# Patient Record
Sex: MALE | Race: WHITE | NOT HISPANIC OR LATINO | Employment: FULL TIME | URBAN - METROPOLITAN AREA
[De-identification: names, ages, dates, MRNs, and addresses within clinical notes are randomized per-mention and may not be internally consistent; named-entity substitution may affect disease eponyms.]

---

## 2018-03-21 ENCOUNTER — OFFICE VISIT CONVERTED (OUTPATIENT)
Dept: UROLOGY | Facility: CLINIC | Age: 63
End: 2018-03-21
Attending: UROLOGY

## 2018-03-22 ENCOUNTER — OFFICE VISIT CONVERTED (OUTPATIENT)
Dept: SURGERY | Facility: CLINIC | Age: 63
End: 2018-03-22
Attending: PHYSICIAN ASSISTANT

## 2018-03-28 ENCOUNTER — OFFICE VISIT CONVERTED (OUTPATIENT)
Dept: UROLOGY | Facility: CLINIC | Age: 63
End: 2018-03-28
Attending: UROLOGY

## 2018-04-13 ENCOUNTER — OFFICE VISIT CONVERTED (OUTPATIENT)
Dept: UROLOGY | Facility: CLINIC | Age: 63
End: 2018-04-13
Attending: UROLOGY

## 2018-09-18 ENCOUNTER — OFFICE VISIT (OUTPATIENT)
Dept: ORTHOPEDIC SURGERY | Facility: CLINIC | Age: 63
End: 2018-09-18

## 2018-09-18 VITALS — BODY MASS INDEX: 34.69 KG/M2 | WEIGHT: 221 LBS | HEIGHT: 67 IN | TEMPERATURE: 98 F

## 2018-09-18 DIAGNOSIS — M25.512 LEFT SHOULDER PAIN, UNSPECIFIED CHRONICITY: ICD-10-CM

## 2018-09-18 DIAGNOSIS — M25.561 RIGHT KNEE PAIN, UNSPECIFIED CHRONICITY: Primary | ICD-10-CM

## 2018-09-18 DIAGNOSIS — Z96.652 STATUS POST TOTAL LEFT KNEE REPLACEMENT: ICD-10-CM

## 2018-09-18 PROCEDURE — 99213 OFFICE O/P EST LOW 20 MIN: CPT | Performed by: ORTHOPAEDIC SURGERY

## 2018-09-18 PROCEDURE — 73030 X-RAY EXAM OF SHOULDER: CPT | Performed by: ORTHOPAEDIC SURGERY

## 2018-09-18 NOTE — PROGRESS NOTES
Chief Complaint   Patient presents with   • Right Knee - Pain   • Left Shoulder - Pain             HPI  Established patient is here today with new complaints of right knee and left shoulder pain.  He has had a prior knee arthroplasty on the left side performed by me in July 2016.  He has done extremely well with that surgery.  His quality of life is improved significantly for the better.  He is not taking any pain medication for his knee pain or discomfort at this point.  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  His left shoulder is bothering him significantly.  He has pain and difficulty with abducting the shoulder.  There is a lot of nighttime pain and discomfort.  He is reluctant to raise his arm into the overhead position.  Shoulder symptoms have been going on for about 3 years.  He has been having pain and discomfort in his opposite, right knee for about 5 years.  The pain is on the medial side of the knee.  There is significant crepitus throughout the range of motion.  There is a sense of clicking and popping in the knee as well.  The patient feels that he cannot squat on the ground because of the knee pain.  I'm concerned that he might have a degenerative tear of the meniscus on the medial side of the right knee.  The patient states that he would like to have it checked out and make sure that she does not require knee replacement surgery at this point.  He works as a  and states that there are several episodes of injury that could have resulted in trauma to the medial meniscus and the rotator cuff as well.          No Known Allergies      Social History     Social History   • Marital status: Single     Spouse name: N/A   • Number of children: N/A   • Years of education: N/A     Occupational History   • Not on file.     Social History Main Topics   • Smoking status: Never Smoker   • Smokeless tobacco: Not on file   • Alcohol use No   • Drug use: No   • Sexual activity: Not on  file     Other Topics Concern   • Not on file     Social History Narrative   • No narrative on file       Family History   Problem Relation Age of Onset   • Cancer Other         Lung   • Heart disease Other    • Hypertension Other        Past Surgical History:   Procedure Laterality Date   • APPENDECTOMY     • COLONOSCOPY      Every 5 Years   • KNEE SURGERY Left 2007    Dr. Engle    • WISDOM TOOTH EXTRACTION         Past Medical History:   Diagnosis Date   • Alcoholism (CMS/HCC)    • Colon polyp    • Diabetes (CMS/HCC)    • HTN (hypertension)            Vitals:    09/18/18 1435   Temp: 98 °F (36.7 °C)             Review of Systems   Constitutional: Negative.    HENT: Negative.    Eyes: Negative.    Respiratory: Negative.    Cardiovascular: Negative.    Gastrointestinal: Negative.    Endocrine: Negative.    Genitourinary: Negative.    Musculoskeletal: Positive for gait problem and joint swelling.   Allergic/Immunologic: Negative.    Hematological: Negative.    Psychiatric/Behavioral: Negative.            Physical Exam   Constitutional: He is oriented to person, place, and time. He appears well-nourished.   HENT:   Head: Atraumatic.   Eyes: EOM are normal.   Neck: Neck supple.   Cardiovascular: Normal heart sounds and intact distal pulses.    Pulmonary/Chest: Breath sounds normal.   Abdominal: Bowel sounds are normal.   Musculoskeletal: He exhibits edema and tenderness.   Neurological: He is alert and oriented to person, place, and time.   Skin: Capillary refill takes 2 to 3 seconds.   Psychiatric: He has a normal mood and affect. His behavior is normal. Judgment and thought content normal.   Nursing note and vitals reviewed.              Joint/Body Part Specific Exam:  right knee. Patient has crepitus throughout range of motion. Positive patellar grind test. Mild effusion. Lachman is negative. Pivot shift is negative. Anterior and posterior drawer signs are negative. Significant joint line tenderness is noted on the  medial aspect of the knee. Patient has a varus orientation of the knee. There is fullness and tenderness in the Popliteal fossa. Mild distention of a Popliteal cyst is noted in this location. Range of motion in flexion is from 0- 100 degrees. Neurovascular status is intact.  Dorsalis pedis and posterior tibial artery pulses are palpable. Common peroneal nerve function is well preserved. Patient's gait is cautious and antalgic. Skin and soft tissues are mildly swollen, consistent with synovitis and effusion. The patient has a significant limp with the first few steps after starting the gait cycle. Getting out of a chair takes a lot of effort due to pain on knee flexion.The knee joint shows effusion with some thickening of the synovial membrane. There is tenderness over the meniscus. Apley’s grinding test is positive over the joint line. Yaw’s sign is positive with increased pain on torsional testing. There is a distinct click in mid flexion. Range of motion is from 0-100 degrees of flexion. No instability on medial or lateral testing. Anterior and posterior drawer testing is negative. Lachman test is negative. Joint line tenderness is present to direct palpation. There is some tenderness over the medial face of the tibia just distal to the joint line. The dorsalis pedis and posterior tibial artery pulses are palpable. Common peroneal nerve function is well preserved. Gait is cautious and somewhat antalgic. Full extension causes the patient to have quite a bit of pain and discomfort.      left knee.The patient is status post total knee arthroplasty postoperative 2 years and 3 month(s). Incision is clean. Calf is soft and nontender. Homans sign is negative. There is no clicking, popping or catching. Anterior and posterior drawer signs are negative.  There is no instability of the components. Appropriate amounts of swelling and bruising are noted. Dorsalis pedis and posterior tibial artery pulses are palpable.  Common peroneal nerve function is well preserved. Range of motion is from 0- to 130° degrees of flexion. Gait is cautious but otherwise fairly normal. There is no evidence of a deep seated joint infection.    left shoulder. The shoulder is extremely tender anteriorly. There is tenderness over the insertion of the rotator cuff over the greater tuberosity of the humerus. Slight proximal migration of the humeral head is noted. AC joint is tender. Crossover adduction test is positive. Drop arm sign is positive. Forward flexion is 0-100 degrees, abduction is 0-100° degrees, external rotation is 0-40° degrees. Patient has mild atrophy both of the supra and infraspinatus fossa. Sulcus sign is negative. There is no evidence of multidirectional instability. Neer test is positive on compression. Skin and soft tissue tenderness is noted. Patient’s strength in abduction and external rotation are extremely lacking. The pain level is 6.  X-RAY Report:  right Knee X-Ray  Indication: Evaluation of pain on the medial aspect of the right knee  AP, Lateral views  Findings: Slight narrowing of the medial joint space with irregular articular surface contour  no bony lesion  Soft tissues within normal limits  decreased joint spaces  Hardware appropriately positioned not applicable      no prior studies available for comparison.    X-RAY was ordered and reviewed by Bryan Li MD    left Shoulder X-Ray  Indication: Evaluation of pain and discomfort over the acromioclavicular articulation  AP and Lateral  Findings: Slight narrowing of the acromioclavicular joint space  no bony lesion  Soft tissues within normal limits  decreased joint spaces  Hardware appropriately positioned not applicable      no prior studies available for comparison.    X-RAY was ordered and reviewed by Bryan Li MD          Diagnostics:            Dave was seen today for pain and pain.    Diagnoses and all orders for this visit:    Right knee pain, unspecified  chronicity  -     XR Knee 1 or 2 View Right    Left shoulder pain, unspecified chronicity  -     XR Shoulder 2+ View Left            Procedures          I provided this patient with educational materials regarding exercise, bone health and cast or splint care.        Plan: Stretching and strengthening exercises of both the knees.    , GI and dental procedure prophylaxis with antibiotics to prevent a metastatic infection to the left knee arthroplasty implants.    Tablet ibuprofen 600 mg orally twice a day for pain swelling and discomfort.    Gentle active mobilization of the left shoulder to prevent adhesive capsulitis and a frozen shoulder.    Schedule an MRI of the left shoulder for evaluation of intra-articular articular pathology such as a rotator cuff tear versus a labrum tear.    Schedule an MRI of the right knee for evaluation of medial meniscus tear and the Baker's cyst.    Application of a brace to the knee on the right side.    Calcium and vitamin D for bone health.    Follow-up in my office after the MRI has been obtained for further decision making on the shoulder as well as the knee.  He might be a candidate for partial meniscectomies he has a meniscus tear and possibly repair of the rotator cuff if that structure is torn.          CC To Sara Hernandez APRN

## 2018-09-28 ENCOUNTER — TELEPHONE (OUTPATIENT)
Dept: ORTHOPEDIC SURGERY | Facility: CLINIC | Age: 63
End: 2018-09-28

## 2018-09-28 NOTE — TELEPHONE ENCOUNTER
SPOKE WITH PATIENT TODAY REGARDING HIS MRI APPROVAL. I EXPLAINED THAT THE INSURANCE COMPANY STATED THAT THE MRI'S ARE PENDING DUE TO THEM NEEDING MORE INFORMATION. I TOLD THE PATIENT THAT I HAVE FAXED HIS NOTES AND WILL GET BACK WITH HIM AS SOON AS I KNOW SOMETHING/hugh

## 2018-10-11 ENCOUNTER — OFFICE VISIT (OUTPATIENT)
Dept: ORTHOPEDIC SURGERY | Facility: CLINIC | Age: 63
End: 2018-10-11

## 2018-10-11 DIAGNOSIS — M17.11 PRIMARY OSTEOARTHRITIS OF RIGHT KNEE: ICD-10-CM

## 2018-10-11 DIAGNOSIS — G89.29 CHRONIC LEFT SHOULDER PAIN: ICD-10-CM

## 2018-10-11 DIAGNOSIS — M25.512 CHRONIC LEFT SHOULDER PAIN: ICD-10-CM

## 2018-10-11 DIAGNOSIS — Z96.652 STATUS POST TOTAL LEFT KNEE REPLACEMENT: Primary | ICD-10-CM

## 2018-10-11 PROCEDURE — 99213 OFFICE O/P EST LOW 20 MIN: CPT | Performed by: ORTHOPAEDIC SURGERY

## 2018-10-11 NOTE — PROGRESS NOTES
Chief Complaint   Patient presents with   • Left Shoulder - Follow-up   • Right Knee - Follow-up           HPI  Patient returns for MRI results of his left shoulder and right knee.  He states he was unable to have the left shoulder MRI due to claustrophobia from a work related injury years ago.  He states that his left shoulder is doing a whole lot better than before.  Swelling and discomfort is settled down quite a bit of motion has improved significantly.  He is right knee continues to bother him significantly.  He has difficulty going up and down the steps.  He has difficulty with squatting on the ground.  When he gets an effusion in the knee feels tight and he cannot squat on the ground.  He is had a left knee replacement performed by me several years ago and is very pleased with that outcome.  He states that anti-inflammatory medication does tend to help him quite a bit both with his shoulder and his knee.  The patient states that he is going to reach higher in April 2019 and at that point he will be prepared to undergo knee replacement surgery.          There were no vitals filed for this visit.        Review of Systems   Constitutional: Negative.    HENT: Negative.    Eyes: Negative.    Respiratory: Negative.    Cardiovascular: Negative.    Gastrointestinal: Negative.    Endocrine: Negative.    Genitourinary: Negative.    Musculoskeletal: Positive for joint swelling and neck pain.   Skin: Negative.    Allergic/Immunologic: Negative.    Neurological: Negative.    Hematological: Negative.    Psychiatric/Behavioral: Negative.            Physical Exam   Constitutional: He is oriented to person, place, and time. He appears well-nourished.   HENT:   Head: Atraumatic.   Eyes: EOM are normal.   Neck: Neck supple.   Cardiovascular: Normal rate.    Pulmonary/Chest: Effort normal.   Abdominal: Bowel sounds are normal.   Musculoskeletal: He exhibits edema and tenderness.   Neurological: He is oriented to person, place,  and time.   Skin: Skin is warm. Capillary refill takes 2 to 3 seconds.   Psychiatric: He has a normal mood and affect. His behavior is normal. Thought content normal.   Nursing note and vitals reviewed.          Joint/Body Part Specific Exam:  right knee. Patient has crepitus throughout range of motion. Positive patellar grind test. Mild effusion. Lachman is negative. Pivot shift is negative. Anterior and posterior drawer signs are negative. Significant joint line tenderness is noted on the medial aspect of the knee. Patient has a varus orientation of the knee. There is fullness and tenderness in the Popliteal fossa. Mild distention of a Popliteal cyst is noted in this location. Range of motion in flexion is from 0- 110 degrees. Neurovascular status is intact.  Dorsalis pedis and posterior tibial artery pulses are palpable. Common peroneal nerve function is well preserved. Patient's gait is cautious and antalgic. Skin and soft tissues are mildly swollen, consistent with synovitis and effusion. The patient has a significant limp with the first few steps after starting the gait cycle. Getting out of a chair takes a lot of effort due to pain on knee flexion.    left knee.The patient is status post total knee arthroplasty postoperative 5 year(s). Incision is clean. Calf is soft and nontender. Homans sign is negative. There is no clicking, popping or catching. Anterior and posterior drawer signs are negative.  There is no instability of the components. Appropriate amounts of swelling and bruising are noted. Dorsalis pedis and posterior tibial artery pulses are palpable. Common peroneal nerve function is well preserved. Range of motion is from 0- 125 degrees of flexion. Gait is cautious but otherwise fairly normal. There is no evidence of a deep seated joint infection.    left Shoulder. Patient has signs of impingement with internal and external rotation. There is a lot of pain and tenderness for the patient over the  subcromial bursa. Alex’s sign is positive. Neer sign is positive. Forward flexion is 0-130° degrees, abduction is 0-140° degrees, external rotation is 0-40° degrees. Rotator cuff function is fairly well preserved except for the impingement at 90 degrees. Apprehension sign is negative. Axillary nerve function is well preserved. Radial artery pulses are palpable. There is no evidence of multidirectional instability. Sulcus sign is negative. Drop arm sign is negative. The patient has some ill-defined tenderness over the greater tuberosity of the humerus. The pain level is 5.  X-RAY Report:        Diagnostics:  MRI reports of the knee are discussed with the patient.  Right knee pictures show that there is extensive degenerative changes with cystic findings in the medial and the lateral meniscus.  There is a tear of the lateral meniscus in the mid body.  There are large lateral osteophytes compressing the collateral ligament on the lateral side.  There are posterior bony osteophytes on the femur and the tibia.  There are small cystic degenerative subchondral changes involving the proximal femur and the tibia.  There is a moderate effusion in the knee joint.  There is a large popliteal cyst which is 4 x 6 x 2.4 cm.  The cartilages pain in both the compartments.  Recommendations for total knee arthroplasty based on these findings.      Dave was seen today for follow-up and follow-up.    Diagnoses and all orders for this visit:    Status post total left knee replacement    Chronic left shoulder pain    Primary osteoarthritis of right knee            Procedures        Plan: Supportive brace on the right knee to prevent it from buckling and giving out.    The patient could not undergo the MRI of his shoulder because he is extremely claustrophobic because he had a close call in a ditch and almost  several years ago as a part of his workplace accident.    He states his shoulder is doing a lot better and has less pain  and discomfort.    Stretching and sensing exercises of the shoulder including overhead abduction with a pulley to minimize arthrofibrosis and adhesive capsulitis.    Intra-articular steroid injection discussed with the patient.    He is going to eventually need a right total knee arthroplasty but the patient would like to wait for as long as possible and I certainly agree with that.    MRI reports discussed with the patient length.    , GI and dental procedure prophylaxis discussed with the patient for the knee arthroplasty implants on the left side.    Follow-up in my office in 3 months for reevaluation.

## 2018-10-27 PROBLEM — M17.11 PRIMARY OSTEOARTHRITIS OF RIGHT KNEE: Status: ACTIVE | Noted: 2018-10-27

## 2019-09-30 ENCOUNTER — OFFICE VISIT (OUTPATIENT)
Dept: ORTHOPEDIC SURGERY | Facility: CLINIC | Age: 64
End: 2019-09-30

## 2019-09-30 VITALS — TEMPERATURE: 98.2 F | HEIGHT: 67 IN | WEIGHT: 228.8 LBS | BODY MASS INDEX: 35.91 KG/M2

## 2019-09-30 DIAGNOSIS — M25.511 RIGHT SHOULDER PAIN, UNSPECIFIED CHRONICITY: Primary | ICD-10-CM

## 2019-09-30 PROCEDURE — 73030 X-RAY EXAM OF SHOULDER: CPT | Performed by: PHYSICIAN ASSISTANT

## 2019-09-30 PROCEDURE — 99214 OFFICE O/P EST MOD 30 MIN: CPT | Performed by: PHYSICIAN ASSISTANT

## 2019-09-30 RX ORDER — GLIMEPIRIDE 4 MG/1
TABLET ORAL
COMMUNITY
Start: 2019-09-03

## 2019-09-30 RX ORDER — LIDOCAINE AND PRILOCAINE 25; 25 MG/G; MG/G
CREAM TOPICAL
COMMUNITY
Start: 2019-07-23

## 2019-09-30 RX ORDER — METOPROLOL SUCCINATE 50 MG/1
TABLET, EXTENDED RELEASE ORAL
COMMUNITY
Start: 2019-09-17

## 2019-09-30 RX ORDER — LISINOPRIL 40 MG/1
TABLET ORAL
COMMUNITY
Start: 2019-09-03

## 2019-10-03 PROBLEM — M25.511 RIGHT SHOULDER PAIN: Status: ACTIVE | Noted: 2019-10-03

## 2019-10-03 NOTE — PROGRESS NOTES
NEW VISIT    Patient: Dave Lowe  ?  YOB: 1955    MRN: [id number]  ?  Chief Complaint   Patient presents with   • Right Shoulder - Pain      ?  HPI:   Mr. Lowe is a 64-year-old male patient who presents today for new complaint of right shoulder pain.  He reports the pain is been present for approximately 1 year and denies any specific injury to the shoulder.  He states he is having difficulty completing every day tasks without a significant amount of discomfort including scooping feet up as well as with any rotating motion of the shoulder.  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  This problem is new to this examiner.      Allergies:   Allergies   Allergen Reactions   • Invokana [Canagliflozin] GI Intolerance       Medications:   Home Medications:  Current Outpatient Medications on File Prior to Visit   Medication Sig   • metFORMIN (GLUCOPHAGE) 500 MG tablet Take 500 mg by mouth.   • glimepiride (AMARYL) 4 MG tablet    • lidocaine-prilocaine (EMLA) 2.5-2.5 % cream    • lisinopril (PRINIVIL,ZESTRIL) 40 MG tablet    • metoprolol succinate XL (TOPROL-XL) 50 MG 24 hr tablet      No current facility-administered medications on file prior to visit.      Current Medications:  Scheduled Meds:  PRN Meds:.    I have reviewed the patient's medical history in detail and updated the computerized patient record.  Review and summarization of old records include:    Past Medical History:   Diagnosis Date  "  • Alcoholism (CMS/HCC)    • Colon polyp    • Diabetes (CMS/HCC)    • HTN (hypertension)      Past Surgical History:   Procedure Laterality Date   • APPENDECTOMY     • COLONOSCOPY      Every 5 Years   • KNEE ARTHROPLASTY Right 07/01/2016    Dr. Bryan Li   • KNEE SURGERY Left 2007    Dr. Engle    • WISDOM TOOTH EXTRACTION       Social History     Occupational History   • Not on file   Tobacco Use   • Smoking status: Never Smoker   • Smokeless tobacco: Never Used   Substance and Sexual Activity   • Alcohol use: No   • Drug use: No   • Sexual activity: Defer      Social History     Social History Narrative   • Not on file     Family History   Problem Relation Age of Onset   • Cancer Other         Lung   • Heart disease Other    • Hypertension Other          Review of Systems  Constitutional: Negative.    HENT: Negative.    Eyes: Negative.    Respiratory: Negative.    Cardiovascular: Negative.    Endocrine: Negative.    Musculoskeletal: Positive for arthralgias, decreased ROM, decreased strength.  Skin: Negative.    Allergic/Immunologic: Negative.    Neurological: Negative.    Hematological: Negative.    Psychiatric/Behavioral: Negative.           Wt Readings from Last 3 Encounters:   09/30/19 104 kg (228 lb 12.8 oz)   09/18/18 100 kg (221 lb)   04/27/16 93 kg (205 lb)     Ht Readings from Last 3 Encounters:   09/30/19 170.2 cm (67\")   09/18/18 168.9 cm (66.5\")   04/27/16 170.2 cm (67\")     Body mass index is 35.84 kg/m².  Facility age limit for growth percentiles is 20 years.  Vitals:    09/30/19 1401   Temp: 98.2 °F (36.8 °C)         Physical Exam  Constitutional: Patient is oriented to person, place, and time. Appears well-developed and well-nourished.   HENT:   Head: Normocephalic and atraumatic.   Eyes: Conjunctivae and EOM are normal. Pupils are equal, round, and reactive to light.   Cardiovascular: Normal rate and intact distal pulses.   Pulmonary/Chest: Effort normal and breath sounds normal. "   Musculoskeletal:   See detailed exam below   Neurological: Alert and oriented to person, place, and time. No sensory deficit. Coordination normal.   Skin: Skin is warm and dry. Capillary refill takes less than 2 seconds. No rash noted. No erythema.   Psychiatric: Patient has a normal mood and affect. His behavior is normal. Judgment and thought content normal.   Nursing note and vitals reviewed.      Ortho Exam:   Right shoulder: Rotator cuff function is slightly impaired.   The glenohumeral joint is tender over the anterior aspect over the articulation.  The humeral head is in normal position. AC joint is mildly tender. Axillary nerve function is well preserved. Radial artery pulses are palpable. Forward flexion is 0-130 degrees, active abduction is 0-120 degrees. Forward flexion is associated with a click and pop.  Supraspinatus stress test positive.  Hawkin's sign is positive. Drop arm sign is negative. External rotation against resistance is painful and somewhat limited. Skin and soft tissues are essentially normal other than mild amounts of swelling. The pain can be reproduced with external rotation and internal rotation, abduction and axial loading of the joint. There is no evidence of multidirectional instability. The pain level is 6.      Diagnostics:  X-Ray Report:  Right shoulder(s) X-Ray  Indication: Evaluation of right shoulder pain  AP, Lateral views  Findings: well preserved joint space, mild arthritic changes  Bony lesion: no  Soft tissues: within normal limits  Joint spaces: within normal limits  Hardware appropriately positioned: not applicable  Prior studies available for comparison: yes of left shoulder from 9/2018 which shows fairly similar joint findings  X-RAY was ordered and reviewed by Migule Montes De Oca PA-C           Assessment:  Dave was seen today for pain.    Diagnoses and all orders for this visit:    Right shoulder pain, unspecified chronicity  -     XR Shoulder 2+ View Right  -      MRI Shoulder Right Arthrogram; Future    ?    Plan    · Steroid injection discussed   · Physical Therapy discussed   · Activity modifications discussed and recommended  · Medication options discussed and recommended  · Further imaging with MR arthrogram discussed, patient would like to proceed with this.  He does state he needs an open MRI and will likely need premedication for the MRI.  We will authorize Valium 5 mg/ 1 p.o. 1 hour prior to procedure and repeat if needed/ # 2 tablets.  · Rest, ice, compression, and elevation (RICE) therapy  · Stretching and strengthening exercises  · Alternate OTC Ibuprofen and Tylenol as needed/if clinically indicated  · Once MRI is complete    Date of encounter: 09/30/2019   Miguel Montes De Oca PA-C    Electronically signed by Miguel Montes De Oca PA-C, 10/03/19, 7:45 AM.

## 2020-09-24 DIAGNOSIS — Z96.652 STATUS POST TOTAL LEFT KNEE REPLACEMENT: Primary | ICD-10-CM

## 2020-09-28 ENCOUNTER — HOSPITAL ENCOUNTER (OUTPATIENT)
Dept: OTHER | Facility: HOSPITAL | Age: 65
Discharge: HOME | End: 2020-09-28
Attending: ORTHOPAEDIC SURGERY

## 2020-09-28 ENCOUNTER — OFFICE VISIT (OUTPATIENT)
Dept: ORTHOPEDIC SURGERY | Facility: CLINIC | Age: 65
End: 2020-09-28

## 2020-09-28 VITALS — BODY MASS INDEX: 35.79 KG/M2 | WEIGHT: 228 LBS | TEMPERATURE: 98 F | HEIGHT: 67 IN

## 2020-09-28 DIAGNOSIS — Z96.652 STATUS POST TOTAL KNEE REPLACEMENT, LEFT: ICD-10-CM

## 2020-09-28 DIAGNOSIS — M25.562 ACUTE PAIN OF LEFT KNEE: ICD-10-CM

## 2020-09-28 DIAGNOSIS — Z96.652 HISTORY OF ARTHROPLASTY OF LEFT KNEE: Primary | ICD-10-CM

## 2020-09-28 PROCEDURE — 99213 OFFICE O/P EST LOW 20 MIN: CPT | Performed by: ORTHOPAEDIC SURGERY

## 2020-10-06 PROBLEM — Z96.652 STATUS POST TOTAL KNEE REPLACEMENT, LEFT: Status: ACTIVE | Noted: 2020-10-06

## 2020-10-16 ENCOUNTER — TELEPHONE (OUTPATIENT)
Dept: ORTHOPEDICS | Facility: OTHER | Age: 65
End: 2020-10-16

## 2020-10-16 NOTE — TELEPHONE ENCOUNTER
I explained to the patient we are still waiting to hear from his insurance company I have refaxed this prior auth to his insurance.

## 2020-10-16 NOTE — TELEPHONE ENCOUNTER
Caller: PATIENT      Relationship to patient: SELF     Best call back number: [phone]    Chief complaint: LEF KNEE PAIN    Type of visit: BONE SCAN    Requested date: 09/28/2020        Additional notes:PT CALLED IN TO CHECK THE STATUS ON HIS BONE SCAN FOR HIS LEFT KNEE PATIENT SAID KNEE IS SORE AND IS WONDERING IF SOMETHING WITH INSURANCE IS HOLDING THIS PROCESS UP. ORDER WAS PLACED ON 9/28/20 FOR THIS SCAN. PLEASE CALL PT BACK REGARDING THIS.

## 2020-10-20 ENCOUNTER — TELEPHONE (OUTPATIENT)
Dept: ORTHOPEDIC SURGERY | Facility: CLINIC | Age: 65
End: 2020-10-20

## 2020-10-20 NOTE — TELEPHONE ENCOUNTER
CALLED AND LEFT MESSAGE EARLIER EXPLAINING WHY I HADN'T CONTACTED THE PATIENT YET. I EXPLAINED THAT IT IS A PROCESS TO GET APPROVAL FOR THESE PROCEDURES AND THAT I MUST HAVE APPROVAL BEFORE SCHEDULING/RJ

## 2020-10-20 NOTE — TELEPHONE ENCOUNTER
Caller: ANTHONY VASQUEZ    Relationship to patient: SELF     Best call back number: [phone]    Chief complaint: PT IUS UPSET NO ONE IS GETTING BACK TO HIM.  HE IS GETTING VERY FRUSTRATED

## 2020-11-05 ENCOUNTER — OFFICE VISIT (OUTPATIENT)
Dept: ORTHOPEDIC SURGERY | Facility: CLINIC | Age: 65
End: 2020-11-05

## 2020-11-05 VITALS — WEIGHT: 230 LBS | TEMPERATURE: 96.4 F | BODY MASS INDEX: 36.1 KG/M2 | HEIGHT: 67 IN

## 2020-11-05 DIAGNOSIS — M17.12 PRIMARY OSTEOARTHRITIS OF LEFT KNEE: ICD-10-CM

## 2020-11-05 DIAGNOSIS — Z96.652 STATUS POST TOTAL KNEE REPLACEMENT, LEFT: Primary | ICD-10-CM

## 2020-11-05 PROCEDURE — 99213 OFFICE O/P EST LOW 20 MIN: CPT | Performed by: ORTHOPAEDIC SURGERY

## 2020-11-05 RX ORDER — INSULIN GLARGINE 100 [IU]/ML
INJECTION, SOLUTION SUBCUTANEOUS
COMMUNITY
Start: 2020-09-10

## 2020-11-05 RX ORDER — HYDROCHLOROTHIAZIDE 25 MG/1
TABLET ORAL
COMMUNITY
Start: 2020-11-03

## 2020-11-05 RX ORDER — TRAMADOL HYDROCHLORIDE 50 MG/1
50 TABLET ORAL NIGHTLY PRN
Qty: 40 TABLET | Refills: 0 | Status: SHIPPED | OUTPATIENT
Start: 2020-11-05

## 2020-11-05 RX ORDER — ATORVASTATIN CALCIUM 40 MG/1
TABLET, FILM COATED ORAL
COMMUNITY
Start: 2020-11-03

## 2020-11-05 NOTE — PROGRESS NOTES
FOLLOW UP VISIT    Patient: Dave Lowe  ?  YOB: 1955    MRN: [id number]  ?  Chief Complaint   Patient presents with   • Left Knee - Follow-up   • Follow-up     review bone scan results      ?  HPI: The patient follows back up on his left total knee arthroplasty.  There was some issue with him having some pain and discomfort.  I was concerned about some loosening of the prosthesis.  A triple phase bone scan has returned and it is essentially negative.  There is no unexpected abnormal uptake on the left knee arthroplasty hardware.  He does have some changes of osteoarthritis of his right knee.  The patient states he will let me know when he is ready for the right knee replacement surgery.    Pain Location: bilateral knee(s)  Radiation: none  Quality: dull  Intensity/Severity: mild   Duration: several months  Onset quality: gradual   Timing: intermittent  Aggravating Factors: going up and down stairs, kneeling and rising after sitting  Alleviating Factors: rest, ice and exercise  Previous Episodes: yes  Associated Symptoms: pain, swelling  ADL Affected: ambulating  Previous Treatment: left total knee replacement    This patient is an established patient.  This problem is not new to this examiner.      Allergies:   Allergies   Allergen Reactions   • Invokana [Canagliflozin] GI Intolerance       Medications:   Home Medications:  Current Outpatient Medications on File Prior to Visit   Medication Sig   • atorvastatin (LIPITOR) 40 MG tablet    • glimepiride (AMARYL) 4 MG tablet    • hydroCHLOROthiazide (HYDRODIURIL) 25 MG tablet    • Insulin Glargine (BASAGLAR KWIKPEN) 100 UNIT/ML injection pen    • lisinopril (PRINIVIL,ZESTRIL) 40 MG tablet    • metFORMIN (GLUCOPHAGE) 500 MG tablet Take 500 mg by mouth.   • metoprolol succinate XL (TOPROL-XL) 50 MG 24 hr tablet    • lidocaine-prilocaine (EMLA) 2.5-2.5 % cream      No current facility-administered medications on file prior to visit.      Current  "Medications:  Scheduled Meds:  PRN Meds:.    I have reviewed the patient's medical history in detail and updated the computerized patient record.  Review and summarization of old records include:    Past Medical History:   Diagnosis Date   • Alcoholism (CMS/HCC)    • Colon polyp    • Diabetes (CMS/HCC)    • HTN (hypertension)      Past Surgical History:   Procedure Laterality Date   • APPENDECTOMY     • COLONOSCOPY      Every 5 Years   • KNEE ARTHROPLASTY Right 07/01/2016    Dr. Bryan Li   • KNEE SURGERY Left 2007    Dr. Engle    • WISDOM TOOTH EXTRACTION       Social History     Occupational History   • Not on file   Tobacco Use   • Smoking status: Never Smoker   • Smokeless tobacco: Never Used   Substance and Sexual Activity   • Alcohol use: No   • Drug use: No   • Sexual activity: Defer      Social History     Social History Narrative   • Not on file     Family History   Problem Relation Age of Onset   • Cancer Other         Lung   • Heart disease Other    • Hypertension Other          Review of Systems  Constitutional: Negative for appetite change.   HENT: Negative.    Eyes: Negative.    Respiratory: Negative.    Cardiovascular: Negative.    Gastrointestinal: Negative.    Endocrine: Negative.    Genitourinary: Negative.    Musculoskeletal: See details of exam below.  Skin: Negative.    Allergic/Immunologic: Negative.    Hematological: Negative.    Psychiatric/Behavioral: Negative.         Wt Readings from Last 3 Encounters:   11/05/20 104 kg (230 lb)   09/28/20 103 kg (228 lb)   09/30/19 104 kg (228 lb 12.8 oz)     Ht Readings from Last 3 Encounters:   11/05/20 170.2 cm (67\")   09/28/20 170.2 cm (67\")   09/30/19 170.2 cm (67\")     Body mass index is 36.02 kg/m².  Facility age limit for growth percentiles is 20 years.  Vitals:    11/05/20 1005   Temp: 96.4 °F (35.8 °C)         Physical Exam  Constitutional: Patient is oriented to person, place, and time. Appears well-developed and well-nourished.   HENT: "   Head: Normocephalic and atraumatic.   Eyes: Conjunctivae and EOM are normal. Pupils are equal, round, and reactive to light.   Cardiovascular: Normal rate, regular rhythm, normal heart sounds and intact distal pulses.   Pulmonary/Chest: Effort normal and breath sounds normal.   Musculoskeletal:   See detailed exam below   Neurological: Alert and oriented to person, place, and time. No sensory deficit. Coordination normal.   Skin: Skin is warm and dry. Capillary refill takes less than 2 seconds. No rash noted. No erythema.   Psychiatric: Patient has a normal mood and affect. His behavior is normal. Judgment and thought content normal.   Nursing note and vitals reviewed.      Ortho Exam:     Left knee.The patient is status post total knee arthroplasty postoperative 4 year(s). Incision is clean. Calf is soft and nontender. Homans sign is negative. There is no clicking, popping or catching. Anterior and posterior drawer signs are negative.  There is no instability of the components. Appropriate amounts of swelling and bruising are noted. Dorsalis pedis and posterior tibial artery pulses are palpable. Common peroneal nerve function is well preserved. Range of motion is from 0-110 degrees of flexion. Gait is cautious but otherwise fairly normal. There is no evidence of a deep seated joint infection.    Right knee. Patient has crepitus throughout range of motion. Positive patellar grind test. Mild effusion. Lachman is negative. Pivot shift is negative. Anterior and posterior drawer signs are negative. Significant joint line tenderness is noted on the medial aspect of the knee. Patient has a varus orientation of the knee. There is fullness and tenderness in the Popliteal fossa. Mild distention of a Popliteal cyst is noted in this location. Range of motion in flexion is from 0- 110 degrees. Neurovascular status is intact.  Dorsalis pedis and posterior tibial artery pulses are palpable. Common peroneal nerve function is  well preserved. Patient's gait is cautious and antalgic. Skin and soft tissues are mildly swollen, consistent with synovitis and effusion. The patient has a significant limp with the first few steps after starting the gait cycle. Getting out of a chair takes a lot of effort due to pain on knee flexion.    Diagnostics:  Triple Phase Bone Scan Results:    Triple phase bone scan reports are available today.  Images are discussed with the patient at length.  There is no abnormal uptake on the left knee arthroplasty hardware to suggest any complication whatsoever.  There is no evidence of infection or loosening.  He does have some changes consistent with osteoarthritis on the opposite knee.  I have pointed that out to the patient on these images.  Based on these images there is no indication for revision or exploration surgery on his left knee replacement.    Assessment:  Diagnoses and all orders for this visit:    1. Status post total knee replacement, left (Primary)    2. Primary osteoarthritis of left knee          Procedures  ?    Plan    · Compression/ hinged knee brace  · Rest, ice, compression, and elevation (RICE) therapy  · Stretching and strengthening exercises  · OTC Alternate Ibuprofen and Tylenol as needed   · Ultram 50Mg take one tablet by mouth at bedtime PRN Pain  · Controlled substance treatment options discussed in detail. Patient's signed consent to medical options on file. RASHAAD form in chart.  The patient is being provided this narcotic prescription to address the acute medical condition that they are undergoing/experiencing at this time.  It is my medical and surgical assessment that more than 3 days of narcotic medication is necessary to help control the pain and discomfort that this patient is experiencing at this point.  Risks of narcotic medication usage outlined.  Possibility of physical and psychological dependence and abuse, especially long term, emphasized to the patient.  I have explained  to the patient that we will try to wean them off the narcotic medication as soon as possible and introduce non-narcotic modalities of pain control.  At this point in the patient's clinical spectrum, however, alternative available treatments are inadequate to control their pain and symptoms.  I have also discussed the possibility of random drug testing as well as pill counts to prevent misuse and misappropriation of the narcotic medications prescribed to the patient.   · Follow up in 1 year(s)    Date of encounter: 11/05/2020   Bryan Li MD

## 2021-03-05 ENCOUNTER — IMMUNIZATION (OUTPATIENT)
Dept: VACCINE CLINIC | Facility: HOSPITAL | Age: 66
End: 2021-03-05

## 2021-03-05 PROCEDURE — 0001A: CPT | Performed by: FAMILY MEDICINE

## 2021-03-05 PROCEDURE — 91300 HC SARSCOV02 VAC 30MCG/0.3ML IM: CPT | Performed by: FAMILY MEDICINE

## 2021-03-26 ENCOUNTER — IMMUNIZATION (OUTPATIENT)
Dept: VACCINE CLINIC | Facility: HOSPITAL | Age: 66
End: 2021-03-26

## 2021-03-26 PROCEDURE — 0002A: CPT | Performed by: INTERNAL MEDICINE

## 2021-03-26 PROCEDURE — 91300 HC SARSCOV02 VAC 30MCG/0.3ML IM: CPT | Performed by: INTERNAL MEDICINE

## 2021-05-16 VITALS — BODY MASS INDEX: 37.67 KG/M2 | HEIGHT: 67 IN | RESPIRATION RATE: 14 BRPM | WEIGHT: 240 LBS

## 2021-05-16 VITALS — RESPIRATION RATE: 12 BRPM | WEIGHT: 240.25 LBS | BODY MASS INDEX: 37.71 KG/M2 | HEIGHT: 67 IN

## 2021-05-16 VITALS — BODY MASS INDEX: 37.67 KG/M2 | WEIGHT: 240 LBS | RESPIRATION RATE: 14 BRPM | HEIGHT: 67 IN

## 2021-05-16 VITALS — BODY MASS INDEX: 37.35 KG/M2 | RESPIRATION RATE: 15 BRPM | HEIGHT: 67 IN | WEIGHT: 238 LBS

## 2021-11-04 RX ORDER — METOPROLOL SUCCINATE 50 MG/1
50 TABLET, EXTENDED RELEASE ORAL DAILY
Qty: 120 TABLET | Refills: 0 | OUTPATIENT
Start: 2021-11-04

## 2022-01-06 RX ORDER — AMLODIPINE BESYLATE 5 MG/1
5 TABLET ORAL DAILY
Qty: 270 TABLET | Refills: 0 | Status: CANCELLED | OUTPATIENT
Start: 2022-01-06

## 2022-04-05 RX ORDER — HYDROCHLOROTHIAZIDE 25 MG/1
25 TABLET ORAL DAILY
Qty: 360 TABLET | Refills: 0 | OUTPATIENT
Start: 2022-04-05

## 2022-04-05 RX ORDER — ATORVASTATIN CALCIUM 40 MG/1
40 TABLET, FILM COATED ORAL
Qty: 360 TABLET | Refills: 0 | OUTPATIENT
Start: 2022-04-05

## 2022-04-05 RX ORDER — LISINOPRIL 40 MG/1
40 TABLET ORAL DAILY
Qty: 330 TABLET | Refills: 0 | OUTPATIENT
Start: 2022-04-05

## 2022-09-10 NOTE — TELEPHONE ENCOUNTER
Caller: PATIENT              Relationship to patient: SELF      Best call back number: [phone]     Chief complaint: LEF KNEE PAIN     Type of visit: BONE SCAN     Requested date: 09/28/2020         Additional notes:PT CALLED IN TO CHECK THE STATUS ON HIS BONE SCAN FOR HIS LEFT KNEE PATIENT SAID KNEE IS SORE AND IS WONDERING IF SOMETHING WITH INSURANCE IS HOLDING THIS PROCESS UP. ORDER WAS PLACED ON 9/28/20 FOR THIS SCAN. PLEASE CALL PT BACK REGARDING THIS.  SAID PLEASE FAX [phone].  IF THIS ISNT TAKEN CARE OF IN NEXT FEW DAYS OR IF ANYTHING IS DENIED HE WOULD LIKE TO BE CALLED [phone].             (1) Oriented to own ability

## 2023-06-26 PROBLEM — G47.00 INSOMNIA: Status: ACTIVE | Noted: 2019-02-07

## 2023-06-26 PROBLEM — I10 HYPERTENSION: Status: ACTIVE | Noted: 2019-02-07

## 2023-06-26 PROBLEM — R35.0 INCREASED FREQUENCY OF URINATION: Status: ACTIVE | Noted: 2023-06-26

## 2023-06-26 PROBLEM — K52.9 COLITIS: Status: ACTIVE | Noted: 2019-12-11

## 2023-06-26 PROBLEM — M75.20 BICEPS TENDINITIS: Status: ACTIVE | Noted: 2019-07-22

## 2023-06-26 PROBLEM — M75.50 BURSITIS OF SHOULDER: Status: ACTIVE | Noted: 2019-07-22

## 2023-06-26 PROBLEM — I10 HIGH BLOOD PRESSURE: Status: ACTIVE | Noted: 2023-06-26

## 2023-06-26 PROBLEM — E78.5 HYPERLIPIDEMIA: Status: ACTIVE | Noted: 2018-10-17

## 2023-06-26 PROBLEM — M17.0 PRIMARY OSTEOARTHRITIS OF BOTH KNEES: Status: ACTIVE | Noted: 2019-07-22

## 2023-06-26 PROBLEM — N52.9 ERECTILE DYSFUNCTION: Status: ACTIVE | Noted: 2019-07-22

## 2023-07-26 ENCOUNTER — LAB (OUTPATIENT)
Dept: LAB | Facility: HOSPITAL | Age: 68
End: 2023-07-26
Payer: MEDICARE

## 2023-07-26 DIAGNOSIS — R97.20 ELEVATED PROSTATE SPECIFIC ANTIGEN (PSA): ICD-10-CM

## 2023-07-26 LAB — PSA SERPL-MCNC: 5.15 NG/ML (ref 0–4)

## 2023-07-26 PROCEDURE — 36415 COLL VENOUS BLD VENIPUNCTURE: CPT

## 2023-07-26 PROCEDURE — 84153 ASSAY OF PSA TOTAL: CPT

## 2023-07-31 ENCOUNTER — OFFICE VISIT (OUTPATIENT)
Dept: UROLOGY | Facility: CLINIC | Age: 68
End: 2023-07-31
Payer: MEDICARE

## 2023-07-31 VITALS — BODY MASS INDEX: 37.42 KG/M2 | WEIGHT: 238.4 LBS | HEIGHT: 67 IN | RESPIRATION RATE: 16 BRPM

## 2023-07-31 DIAGNOSIS — R97.20 ELEVATED PROSTATE SPECIFIC ANTIGEN (PSA): Primary | ICD-10-CM

## 2023-07-31 PROBLEM — Z96.659 LOOSE TOTAL KNEE ARTHROPLASTY: Status: ACTIVE | Noted: 2022-05-18

## 2023-07-31 PROBLEM — T84.038A LOOSE TOTAL KNEE ARTHROPLASTY: Status: ACTIVE | Noted: 2022-05-18

## 2023-07-31 PROCEDURE — 1159F MED LIST DOCD IN RCRD: CPT | Performed by: NURSE PRACTITIONER

## 2023-07-31 PROCEDURE — 1160F RVW MEDS BY RX/DR IN RCRD: CPT | Performed by: NURSE PRACTITIONER

## 2023-07-31 PROCEDURE — 99214 OFFICE O/P EST MOD 30 MIN: CPT | Performed by: NURSE PRACTITIONER

## 2023-07-31 RX ORDER — DIAZEPAM 5 MG/1
5 TABLET ORAL
Qty: 1 TABLET | Refills: 0 | Status: SHIPPED | OUTPATIENT
Start: 2023-07-31

## 2023-08-04 ENCOUNTER — TELEPHONE (OUTPATIENT)
Dept: UROLOGY | Facility: CLINIC | Age: 68
End: 2023-08-04
Payer: MEDICARE

## 2023-08-23 ENCOUNTER — PREP FOR SURGERY (OUTPATIENT)
Dept: SURGERY | Facility: SURGERY CENTER | Age: 68
End: 2023-08-23
Payer: MEDICARE

## 2023-08-23 DIAGNOSIS — Z12.11 ENCOUNTER FOR SCREENING FOR MALIGNANT NEOPLASM OF COLON: Primary | ICD-10-CM

## 2023-09-14 DIAGNOSIS — R97.20 ELEVATED PROSTATE SPECIFIC ANTIGEN (PSA): ICD-10-CM

## 2023-09-15 ENCOUNTER — PREP FOR SURGERY (OUTPATIENT)
Dept: OTHER | Facility: HOSPITAL | Age: 68
End: 2023-09-15
Payer: MEDICARE

## 2023-09-15 DIAGNOSIS — R97.20 ELEVATED PROSTATE SPECIFIC ANTIGEN (PSA): Primary | ICD-10-CM

## 2023-09-15 RX ORDER — SODIUM CHLORIDE 0.9 % (FLUSH) 0.9 %
3 SYRINGE (ML) INJECTION EVERY 12 HOURS SCHEDULED
OUTPATIENT
Start: 2023-09-15

## 2023-09-15 RX ORDER — SODIUM CHLORIDE 0.9 % (FLUSH) 0.9 %
10 SYRINGE (ML) INJECTION AS NEEDED
OUTPATIENT
Start: 2023-09-15

## 2023-09-15 RX ORDER — SODIUM CHLORIDE 9 MG/ML
40 INJECTION, SOLUTION INTRAVENOUS AS NEEDED
OUTPATIENT
Start: 2023-09-15

## 2023-09-15 RX ORDER — SODIUM CHLORIDE 9 MG/ML
100 INJECTION, SOLUTION INTRAVENOUS CONTINUOUS
OUTPATIENT
Start: 2023-09-15

## 2023-09-15 RX ORDER — CIPROFLOXACIN 500 MG/1
TABLET, FILM COATED ORAL
Qty: 6 TABLET | Refills: 0 | Status: SHIPPED | OUTPATIENT
Start: 2023-09-15

## 2023-09-27 ENCOUNTER — TELEPHONE (OUTPATIENT)
Dept: UROLOGY | Facility: CLINIC | Age: 68
End: 2023-09-27
Payer: MEDICARE

## 2023-09-27 NOTE — TELEPHONE ENCOUNTER
PATIENT CALLED AND SAID HE IS SCHEDULED FOR A BIOPSY WITH DR. BEAR.  HE HAS LOST THE INSTRUCTIONS WHICH WERE MAILED TO HIM.  PLEASE CALL HIM AND/OR PUT ON MY CHART PLEASE.

## 2023-10-04 NOTE — PRE-PROCEDURE INSTRUCTIONS
IMPORTANT INSTRUCTIONS - PRE-ADMISSION TESTING  DO NOT EAT OR CHEW anything after midnight the night before your procedure.    You may have CLEAR liquids up to __2__ hours prior to ARRIVAL time.   Take the following medications the morning of your procedure with JUST A SIP OF WATER:  __metoprolol, 1/2 dose basaglar insulin, ________    DO NOT BRING your medications to the hospital with you, UNLESS something has changed since your PRE-Admission Testing appointment.  Hold all vitamins, supplements, and NSAIDS (Non- steroidal anti-inflammatory meds) for one week prior to surgery (you MAY take Tylenol or Acetaminophen).  If you are diabetic, check your blood sugar the morning of your procedure. If it is less than 70 or if you are feeling symptomatic, call the following number for further instructions: [phone]  Use your inhalers/nebulizers as usual, the morning of your procedure. BRING YOUR INHALERS with you.   Bring your CPAP or BIPAP to hospital, ONLY IF YOU WILL BE SPENDING THE NIGHT.   Make sure you have a ride home and have someone who will stay with you the day of your procedure after you go home.  If you have any questions, please call your Pre-Admission Testing Nurse, DAMON ____ at [phone]_.   Per anesthesia request, do not smoke for 24 hours before your procedure or as instructed by your surgeon.

## 2023-10-05 ENCOUNTER — HOSPITAL ENCOUNTER (OUTPATIENT)
Facility: HOSPITAL | Age: 68
Setting detail: HOSPITAL OUTPATIENT SURGERY
Discharge: HOME | End: 2023-10-05
Attending: UROLOGY | Admitting: UROLOGY
Payer: MEDICARE

## 2023-10-05 ENCOUNTER — ANESTHESIA (OUTPATIENT)
Dept: PERIOP | Facility: HOSPITAL | Age: 68
End: 2023-10-05
Payer: MEDICARE

## 2023-10-05 ENCOUNTER — ANESTHESIA EVENT (OUTPATIENT)
Dept: PERIOP | Facility: HOSPITAL | Age: 68
End: 2023-10-05
Payer: MEDICARE

## 2023-10-05 VITALS
RESPIRATION RATE: 18 BRPM | TEMPERATURE: 97.8 F | HEIGHT: 67 IN | SYSTOLIC BLOOD PRESSURE: 148 MMHG | WEIGHT: 238.54 LBS | DIASTOLIC BLOOD PRESSURE: 77 MMHG | BODY MASS INDEX: 37.44 KG/M2 | OXYGEN SATURATION: 95 % | HEART RATE: 64 BPM

## 2023-10-05 DIAGNOSIS — R97.20 ELEVATED PROSTATE SPECIFIC ANTIGEN (PSA): ICD-10-CM

## 2023-10-05 LAB
GLUCOSE BLDC GLUCOMTR-MCNC: 158 MG/DL (ref 70–99)
GLUCOSE BLDC GLUCOMTR-MCNC: 175 MG/DL (ref 70–99)

## 2023-10-05 PROCEDURE — 25010000002 MIDAZOLAM PER 1MG: Performed by: ANESTHESIOLOGY

## 2023-10-05 PROCEDURE — 88305 TISSUE EXAM BY PATHOLOGIST: CPT | Performed by: UROLOGY

## 2023-10-05 PROCEDURE — 55700 PR PROSTATE NEEDLE BIOPSY ANY APPROACH: CPT | Performed by: UROLOGY

## 2023-10-05 PROCEDURE — 76942 ECHO GUIDE FOR BIOPSY: CPT | Performed by: UROLOGY

## 2023-10-05 PROCEDURE — 25010000002 ONDANSETRON PER 1 MG: Performed by: NURSE ANESTHETIST, CERTIFIED REGISTERED

## 2023-10-05 PROCEDURE — 25810000003 LACTATED RINGERS PER 1000 ML: Performed by: ANESTHESIOLOGY

## 2023-10-05 PROCEDURE — 25010000002 CEFOXITIN PER 1 G: Performed by: NURSE PRACTITIONER

## 2023-10-05 PROCEDURE — 82948 REAGENT STRIP/BLOOD GLUCOSE: CPT

## 2023-10-05 PROCEDURE — 25010000002 PROPOFOL 10 MG/ML EMULSION: Performed by: NURSE ANESTHETIST, CERTIFIED REGISTERED

## 2023-10-05 RX ORDER — ONDANSETRON 2 MG/ML
4 INJECTION INTRAMUSCULAR; INTRAVENOUS ONCE AS NEEDED
Status: DISCONTINUED | OUTPATIENT
Start: 2023-10-05 | End: 2023-10-05 | Stop reason: HOSPADM

## 2023-10-05 RX ORDER — PROPOFOL 10 MG/ML
VIAL (ML) INTRAVENOUS AS NEEDED
Status: DISCONTINUED | OUTPATIENT
Start: 2023-10-05 | End: 2023-10-05 | Stop reason: SURG

## 2023-10-05 RX ORDER — SODIUM CHLORIDE 9 MG/ML
40 INJECTION, SOLUTION INTRAVENOUS AS NEEDED
Status: DISCONTINUED | OUTPATIENT
Start: 2023-10-05 | End: 2023-10-05 | Stop reason: HOSPADM

## 2023-10-05 RX ORDER — MIDAZOLAM HYDROCHLORIDE 2 MG/2ML
2 INJECTION, SOLUTION INTRAMUSCULAR; INTRAVENOUS ONCE
Status: COMPLETED | OUTPATIENT
Start: 2023-10-05 | End: 2023-10-05

## 2023-10-05 RX ORDER — MEPERIDINE HYDROCHLORIDE 25 MG/ML
12.5 INJECTION INTRAMUSCULAR; INTRAVENOUS; SUBCUTANEOUS
Status: DISCONTINUED | OUTPATIENT
Start: 2023-10-05 | End: 2023-10-05 | Stop reason: HOSPADM

## 2023-10-05 RX ORDER — OXYCODONE HYDROCHLORIDE 5 MG/1
5 TABLET ORAL
Status: DISCONTINUED | OUTPATIENT
Start: 2023-10-05 | End: 2023-10-05 | Stop reason: HOSPADM

## 2023-10-05 RX ORDER — ONDANSETRON 2 MG/ML
INJECTION INTRAMUSCULAR; INTRAVENOUS AS NEEDED
Status: DISCONTINUED | OUTPATIENT
Start: 2023-10-05 | End: 2023-10-05 | Stop reason: SURG

## 2023-10-05 RX ORDER — PROMETHAZINE HYDROCHLORIDE 12.5 MG/1
12.5 TABLET ORAL ONCE AS NEEDED
Status: DISCONTINUED | OUTPATIENT
Start: 2023-10-05 | End: 2023-10-05 | Stop reason: HOSPADM

## 2023-10-05 RX ORDER — LIDOCAINE HYDROCHLORIDE 20 MG/ML
INJECTION, SOLUTION EPIDURAL; INFILTRATION; INTRACAUDAL; PERINEURAL AS NEEDED
Status: DISCONTINUED | OUTPATIENT
Start: 2023-10-05 | End: 2023-10-05 | Stop reason: SURG

## 2023-10-05 RX ORDER — PROMETHAZINE HYDROCHLORIDE 25 MG/1
25 SUPPOSITORY RECTAL ONCE AS NEEDED
Status: DISCONTINUED | OUTPATIENT
Start: 2023-10-05 | End: 2023-10-05 | Stop reason: HOSPADM

## 2023-10-05 RX ORDER — SODIUM CHLORIDE, SODIUM LACTATE, POTASSIUM CHLORIDE, CALCIUM CHLORIDE 600; 310; 30; 20 MG/100ML; MG/100ML; MG/100ML; MG/100ML
9 INJECTION, SOLUTION INTRAVENOUS CONTINUOUS PRN
Status: DISCONTINUED | OUTPATIENT
Start: 2023-10-05 | End: 2023-10-05 | Stop reason: HOSPADM

## 2023-10-05 RX ORDER — SODIUM CHLORIDE 9 MG/ML
100 INJECTION, SOLUTION INTRAVENOUS CONTINUOUS
Status: DISCONTINUED | OUTPATIENT
Start: 2023-10-05 | End: 2023-10-05 | Stop reason: HOSPADM

## 2023-10-05 RX ORDER — IBUPROFEN 600 MG/1
600 TABLET ORAL EVERY 6 HOURS PRN
Status: DISCONTINUED | OUTPATIENT
Start: 2023-10-05 | End: 2023-10-05 | Stop reason: HOSPADM

## 2023-10-05 RX ORDER — SODIUM CHLORIDE 0.9 % (FLUSH) 0.9 %
10 SYRINGE (ML) INJECTION AS NEEDED
Status: DISCONTINUED | OUTPATIENT
Start: 2023-10-05 | End: 2023-10-05 | Stop reason: HOSPADM

## 2023-10-05 RX ORDER — ACETAMINOPHEN 325 MG/1
650 TABLET ORAL ONCE
Status: DISCONTINUED | OUTPATIENT
Start: 2023-10-05 | End: 2023-10-05 | Stop reason: HOSPADM

## 2023-10-05 RX ORDER — SODIUM CHLORIDE 0.9 % (FLUSH) 0.9 %
3 SYRINGE (ML) INJECTION EVERY 12 HOURS SCHEDULED
Status: DISCONTINUED | OUTPATIENT
Start: 2023-10-05 | End: 2023-10-05 | Stop reason: HOSPADM

## 2023-10-05 RX ORDER — PROMETHAZINE HYDROCHLORIDE 12.5 MG/1
25 TABLET ORAL ONCE AS NEEDED
Status: DISCONTINUED | OUTPATIENT
Start: 2023-10-05 | End: 2023-10-05 | Stop reason: HOSPADM

## 2023-10-05 RX ADMIN — Medication 2 G: at 09:23

## 2023-10-05 RX ADMIN — SODIUM CHLORIDE, POTASSIUM CHLORIDE, SODIUM LACTATE AND CALCIUM CHLORIDE 9 ML/HR: 600; 310; 30; 20 INJECTION, SOLUTION INTRAVENOUS at 08:46

## 2023-10-05 RX ADMIN — ONDANSETRON 4 MG: 2 INJECTION INTRAMUSCULAR; INTRAVENOUS at 09:24

## 2023-10-05 RX ADMIN — PROPOFOL 80 MG: 10 INJECTION, EMULSION INTRAVENOUS at 09:27

## 2023-10-05 RX ADMIN — LIDOCAINE HYDROCHLORIDE 100 MG: 20 INJECTION, SOLUTION EPIDURAL; INFILTRATION; INTRACAUDAL; PERINEURAL at 09:24

## 2023-10-05 RX ADMIN — PROPOFOL 250 MCG/KG/MIN: 10 INJECTION, EMULSION INTRAVENOUS at 09:27

## 2023-10-05 RX ADMIN — PROPOFOL 20 MG: 10 INJECTION, EMULSION INTRAVENOUS at 09:35

## 2023-10-05 RX ADMIN — MIDAZOLAM HYDROCHLORIDE 2 MG: 1 INJECTION, SOLUTION INTRAMUSCULAR; INTRAVENOUS at 09:08

## 2023-10-05 NOTE — ANESTHESIA PREPROCEDURE EVALUATION
Anesthesia Evaluation     Patient summary reviewed and Nursing notes reviewed   no history of anesthetic complications:   NPO Solid Status: > 8 hours  NPO Liquid Status: > 2 hours           Airway   Mallampati: II  TM distance: >3 FB  Neck ROM: full  No difficulty expected  Dental      Pulmonary - normal exam    breath sounds clear to auscultation  (+) ,shortness of breath  Cardiovascular - normal exam  Exercise tolerance: good (4-7 METS)    Rhythm: regular  Rate: normal    (+) hypertension, hyperlipidemia      Neuro/Psych- negative ROS  GI/Hepatic/Renal/Endo    (+) obesity, diabetes mellitus type 2    Musculoskeletal (-) negative ROS    Abdominal    Substance History - negative use     OB/GYN negative ob/gyn ROS         Other - negative ROS       ROS/Med Hx Other: PAT Nursing Notes unavailable.                 Anesthesia Plan    ASA 3     general     (Total IV Anesthesia    Patient understands anesthesia not responsible for dental damage.  )  intravenous induction     Anesthetic plan, risks, benefits, and alternatives have been provided, discussed and informed consent has been obtained with: patient.  Pre-procedure education provided  Plan discussed with CRNA.    CODE STATUS:

## 2023-10-05 NOTE — ANESTHESIA POSTPROCEDURE EVALUATION
Patient: Dave Lowe    Procedure Summary       Date: 10/05/23 Room / Location: [facility] OR 04 / [facility] MAIN OR    Anesthesia Start: 0923 Anesthesia Stop: 0950    Procedure: PROSTATE ULTRASOUND BIOPSY MRI FUSION WITH URONAV (Rectum) Diagnosis:       Elevated prostate specific antigen (PSA)      (Elevated prostate specific antigen (PSA) [R97.20])    Surgeons: Anais Cheney MD Provider: Mateo Narvaez MD    Anesthesia Type: general ASA Status: 3            Anesthesia Type: general    Vitals  Vitals Value Taken Time   /98 10/05/23 1029   Temp 36.4 °C (97.6 °F) 10/05/23 1015   Pulse 61 10/05/23 1031   Resp 12 10/05/23 1025   SpO2 95 % 10/05/23 1031   Vitals shown include unvalidated device data.        Post Anesthesia Care and Evaluation    Patient location during evaluation: bedside  Patient participation: complete - patient participated  Level of consciousness: awake  Pain management: adequate    Airway patency: patent  PONV Status: none  Cardiovascular status: acceptable  Respiratory status: acceptable  Hydration status: acceptable    Comments: An Anesthesiologist personally participated in the most demanding procedures (including induction and emergence if applicable) in the anesthesia plan, monitored the course of anesthesia administration at frequent intervals and remained physically present and available for immediate diagnosis and treatment of emergencies.

## 2023-10-05 NOTE — OP NOTE
PROSTATE ULTRASOUND BIOPSY MRI FUSION WITH URONAV  Procedure Report    Patient Name:  Dave Lowe  YOB: 1955    Date of Surgery:  10/5/2023     Pre-op Diagnosis:   Elevated prostate specific antigen (PSA) [R97.20]       Post-Op Diagnosis Codes:     * Elevated prostate specific antigen (PSA) [R97.20]      Procedure/CPT® Codes:    Procedure(s):  PROSTATE ULTRASOUND BIOPSY MRI FUSION WITH URONAV      Staff:  Surgeon(s):  Anais Cheney MD         Anesthesia: Monitored Anesthesia Care    Estimated Blood Loss: none    Implants:    Nothing was implanted during the procedure    Specimen:          Specimens       ID Source Type Tests Collected By Collected At Frozen?    A Prostate Tissue TISSUE PATHOLOGY EXAM   Anais Cheney MD 10/5/23 0809     Description: RIGHT SIDE x 5    This specimen was not marked as sent.    B Prostate Tissue TISSUE PATHOLOGY EXAM   Anais Cheney MD 10/5/23 0809     Description: LEFT SIDE x 5    This specimen was not marked as sent.    C Prostate Tissue TISSUE PATHOLOGY EXAM   Anais Cheney MD 10/5/23 0809     Description: TOMAS#1 LEFT POSTERIOR x 3    This specimen was not marked as sent.    D Prostate Tissue TISSUE PATHOLOGY EXAM   Anais Cheney MD 10/5/23 0915     Description: TOMAS#2 LEFT ANTERIOR x 3    This specimen was not marked as sent.    E Prostate Tissue TISSUE PATHOLOGY EXAM   Anais Cheney MD 10/5/23 0915     Description: TOMAS#3 POSTERIOR x 3    This specimen was not marked as sent.                Complications: None    Description of Procedure:     An ultrasound probe was placed into the rectum and the prostate was inspected.  No obvious lesions were seen.    The prostate was then imaged and examined using the ultrasound and this was fused with his previous MRI of his prostate using the fusion software.      The right and left sides of the prostate were then biopsied randomly using the biopsy needle.  They were labeled as right and left prostate  biopsy.  5 were taken on the left and 5 on the right.     At this point, the biopsies were taken of the regions of interest from the prostate.  These were sent to pathology and labeled as region of interest 1, 2, and 3.      The patient tolerated this well.  The ultrasound probe was removed.  He was transferred to the PACU in stable condition.       Anais Cheney MD     Date: 10/5/2023  Time: 10:09 EDT

## 2023-10-05 NOTE — H&P
[facility]   Urology HISTORY AND PHYSICAL    Patient Name: Dave Lowe  : 1955  MRN: [id number]  Primary Care Physician:  Daisy Muhammad MD  Date of admission: 10/5/2023    Subjective   Subjective       History of Present Illness  Patient has an elevated PSA and lesion of the prostate and presents for MRI fusion transrectal ultrasound and biopsy of the prostate.       Personal History     Past Medical History:   Diagnosis Date    Alcoholism     Benign prostatic hyperplasia     Colon polyp     Diabetes     Elevated prostate specific antigen (PSA) 09/15/2023    Erectile dysfunction     HTN (hypertension)     Prostatitis     Skin cancer        Past Surgical History:   Procedure Laterality Date    APPENDECTOMY      COLONOSCOPY      Every 5 Years    KNEE ARTHROPLASTY Right 2016    Dr. Bryan Li    KNEE ARTHROPLASTY Left     KNEE SURGERY Left     Dr. Engle     WISDOM TOOTH EXTRACTION         Family History: family history includes Cancer in an other family member; Heart disease in an other family member; Hypertension in an other family member. Otherwise pertinent FHx was reviewed and not pertinent to current issue.    Social History:  reports that he has never smoked. He has never used smokeless tobacco. He reports that he does not drink alcohol and does not use drugs.    Home Medications:  Ascorbic Acid, BASAGLAR KWIKPEN, atorvastatin, cholecalciferol, ciprofloxacin, glimepiride, hydroCHLOROthiazide, lisinopril, metFORMIN, metoprolol succinate XL, and multivitamin with minerals    Allergies:  Allergies   Allergen Reactions    Canagliflozin GI Intolerance       Objective    Objective     Vitals:   Temp:  [97.2 °F (36.2 °C)] 97.2 °F (36.2 °C)  Heart Rate:  [68] 68  Resp:  [18] 18  BP: (139)/(78) 139/78    Physical Exam  Constitutional:       Appearance: Normal appearance.   Cardiovascular:      Rate and Rhythm: Normal rate and regular rhythm.   Pulmonary:      Effort: Pulmonary effort  is normal.      Breath sounds: Normal breath sounds.   Neurological:      Mental Status: He is alert. Mental status is at baseline.   Psychiatric:         Mood and Affect: Mood and affect normal.         Speech: Speech normal.         Judgment: Judgment normal.       Result Review    Result Review:  I have personally reviewed the results from the time of this admission to 10/5/2023 08:04 EDT and agree with these findings:  [x]  Laboratory  []  Microbiology  [x]  Radiology  []  EKG/Telemetry   []  Cardiology/Vascular   []  Pathology  []  Old records  []  Other:      Assessment & Plan   Assessment / Plan       Active Hospital Problems:  Active Hospital Problems    Diagnosis     **Elevated prostate specific antigen (PSA)        Plan: MRI fusion transrectal ultrasound and biopsy of the prostate  Risks and benefits discussed with patient and they are agreeable to proceed.    DVT prophylaxis:  No DVT prophylaxis order currently exists.    CODE STATUS:           Electronically signed by Anais Cheney MD, 10/05/23, 8:04 AM EDT.

## 2023-10-05 NOTE — DISCHARGE INSTRUCTIONS
Discharge Instructions Prostate Biopsy       For your surgery you had:  General anesthesia (you may have a sore throat)  You received a medicated patch for nausea prevention today (behind your ear). It is recommended that you remove it 24-48 hours post-operatively. It must be removed within 72 hours.  You may experience dizziness, drowsiness, or lightheadedness for several hours following surgery.  Do not stay alone today or tonight.  Limit your activity for 24 hours.  You should not drive, operate machinery, drink alcohol, or sign legally binding documents for 24 hours or while you are taking pain medication.  Resume your diet slowly.  Follow any special dietary instructions you may have been given by your doctor.  Last dose of pain medication given at:   .  NOTIFY YOUR DOCTOR IF YOU EXPERIENCE ANY OF THE FOLLOWING:  Temperature greater than 101 degrees Fahrenheit  Shaking Chills  Redness or excessive drainage from incision  Nausea, vomiting and/or pain that is not controlled by prescribed medications  Increase in bleeding or bleeding that is excessive  Unable to urinate in 6 hours after surgery  If unable to reach your doctor, please go to the closest Emergency Room.   Drink 4-6 glasses of water a day for 3-4 days  You may see blood in your urine for up to a week, blood in your stool for 3-4 days, and blood in semen for up to a month  If you are passing large clots, call your doctor  Avoid lifting or straining for 48 hours  It is normal to experience burning during urination for 48 hours after biopsy  Call your doctor if pain, burning, urgency, or frequency of urination persist beyond 48 hours  Medications per physician as indicated on discharge medication information sheet    Access Lab and other Diagnostic Test results and manage your Personal Health Records by going to: [email or web address]   SPECIAL INSTRUCTIONS:

## 2023-10-06 LAB
CYTO UR: NORMAL
LAB AP CASE REPORT: NORMAL
LAB AP CLINICAL INFORMATION: NORMAL
PATH REPORT.FINAL DX SPEC: NORMAL
PATH REPORT.GROSS SPEC: NORMAL

## 2023-10-18 ENCOUNTER — OFFICE VISIT (OUTPATIENT)
Dept: UROLOGY | Facility: CLINIC | Age: 68
End: 2023-10-18
Payer: MEDICARE

## 2023-10-18 VITALS
BODY MASS INDEX: 38.92 KG/M2 | DIASTOLIC BLOOD PRESSURE: 93 MMHG | HEIGHT: 67 IN | TEMPERATURE: 97.9 F | WEIGHT: 248 LBS | SYSTOLIC BLOOD PRESSURE: 190 MMHG

## 2023-10-18 DIAGNOSIS — C61 PROSTATE CANCER: Primary | ICD-10-CM

## 2023-10-18 LAB
BILIRUB BLD-MCNC: NEGATIVE MG/DL
CLARITY, POC: CLEAR
COLOR UR: YELLOW
EXPIRATION DATE: ABNORMAL
GLUCOSE UR STRIP-MCNC: ABNORMAL MG/DL
KETONES UR QL: NEGATIVE
LEUKOCYTE EST, POC: NEGATIVE
Lab: ABNORMAL
NITRITE UR-MCNC: NEGATIVE MG/ML
PH UR: 7 [PH] (ref 5–8)
PROT UR STRIP-MCNC: ABNORMAL MG/DL
RBC # UR STRIP: ABNORMAL /UL
SP GR UR: 1.02 (ref 1–1.03)
UROBILINOGEN UR QL: ABNORMAL

## 2023-10-18 NOTE — PROGRESS NOTES
"Chief Complaint  Elevated PSA    Subjective          Dave Lowe presents to [facility] UROLOGY    History of Present Illness  Patient is here for follow-up for results of prostate biopsy.  Results are detailed below.  He has no complaints.      Objective   Vital Signs:   BP (!) 190/93   Temp 97.9 °F (36.6 °C)   Ht 170.2 cm (67\")   Wt 112 kg (248 lb)   BMI 38.84 kg/m²       Physical Exam  Vitals and nursing note reviewed.   Constitutional:       Appearance: Normal appearance. He is well-developed.   Pulmonary:      Effort: Pulmonary effort is normal.      Breath sounds: Normal air entry.   Neurological:      Mental Status: He is alert and oriented to person, place, and time.      Motor: Motor function is intact.   Psychiatric:         Mood and Affect: Mood normal.         Behavior: Behavior normal.          Result Review :   The following data was reviewed by: Anais Cheney MD on 10/18/2023:    Results for orders placed or performed in visit on 10/18/23   POC Urinalysis Dipstick, Automated    Specimen: Urine   Result Value Ref Range    Color Yellow Yellow, Straw, Dark Yellow, Michaela    Clarity, UA Clear Clear    Specific Gravity  1.020 1.005 - 1.030    pH, Urine 7.0 5.0 - 8.0    Leukocytes Negative Negative    Nitrite, UA Negative Negative    Protein,  mg/dL (A) Negative mg/dL    Glucose, UA >=1000 mg/dL (3+) (A) Negative mg/dL    Ketones, UA Negative Negative    Urobilinogen, UA 0.2 E.U./dL Normal, 0.2 E.U./dL    Bilirubin Negative Negative    Blood, UA Small (A) Negative    Lot Number [id number]     Expiration Date 92,024        PSA          7/26/2023    09:41   PSA   PSA 5.150          Case Report   Surgical Pathology Report                         Case: [id number]                                   Authorizing Provider:  Anais Cheney MD      Collected:           10/05/2023 08:09 AM           Ordering Location:     [facility] Received:            10/05/2023 " 11:33 AM                                  OR                                                                           Pathologist:           Jennifer Adhikari DO                                                       Specimens:   1) - Prostate, RIGHT SIDE x 5                                                                        2) - Prostate, LEFT SIDE x 5                                                                        3) - Prostate, TOMAS#1 LEFT POSTERIOR x 3                                                              4) - Prostate, TOMAS#2 LEFT ANTERIOR x 3                                                              5) - Prostate, TOMAS#3 POSTERIOR x 3                                                        Clinical Information    Elevated prostate specific antigen (PSA)   Final Diagnosis   1. Prostate gland, right, needle core biopsy:               - Benign prostatic tissue               - Partial atrophy         2. Prostate gland, left , needle core biopsy:               - Adenocarcinoma, Grade Group 1 (Lai grade 3+3 = score of 6), in 2 of 5 cores, involving 3% of needle core tissue, and measuring 1 mm in length         3. Prostate gland, region of interest #1, left posterior, needle core biopsy:               - Adenocarcinoma, Grade Group 1 (Lai grade 3+3 = score of 6), in 1 of 3 cores, involving 2% of needle core tissue, and measuring 1 mm in length         4. Prostate gland, region of interest #2, left anterior, needle core biopsy:               - Benign prostatic tissue         5. Prostate gland, region of interest #3, posterior, needle core biopsy:               - Adenocarcinoma, Grade Group 3 (Lai grade 4+3 = score of 7), in 3 of 3 cores, involving 31% of needle core tissue, and measuring 3 mm in length      The above positive (malignant) diagnosis was called to Dr. Cheney  at  10:11 EDT on  10/6/2023 by LARA.   Electronically signed by Jennifer Adhikari DO on 10/6/2023 at 1301             Assessment and Plan    Diagnoses and all orders for this visit:    1. Prostate cancer (Primary)  -     POC Urinalysis Dipstick, Automated      The patient has prostatic cancer by recent biopsy. I have had a long discussion regarding the options including observation, surgery, radiation, cryotherapy and hormonal therapies. Risks and benefits of each were explained fully and questions were answered.    We will have a CT scan and bone scan done.  I will see her back after those results.    Active surveillance: Patient understands that active surveillance is typically reserved for low-grade low-volume prostate cancer.  It involves routine PSA checks as well as repeat prostate MRI and/or prostate biopsies.  Patient also understands that there may be a need to proceed to treatment depending on the natural progression of the cancer.  Patient also understands there is inherent risk of progression of cancer while on an active surveillance protocol.    Robotic radical prostatectomy:  The risks and benefits of a robotic radical prostatectomy were discussed. Risks include, but are not limited to, injury to surrounding organs including vessels, bladder, sphincter, and bowel; urinary incontinence/dysfunctional voiding, persistent erectile dysfunction, bowel obstruction, bleeding that might require a transfusion, DVT, PE, MI, CVA, abdominal hernia and death. He also understands the need to perform an open radical prostatectomy and this would be at the decision and discretion of the surgeon. He understands these risks and potentially others, and desires to proceed.    Brachytherapy: The risks and benefits including but not limited to voiding symptoms, rectal damage and symptoms, failure to cure the cancer and possible need for additional treatments were discussed.    External beam radiation:   The risks and benefits including but not limited to voiding symptoms, rectal damage and symptoms, failure to cure the cancer and possible  need for additional treatments were discussed.    Hormone Therapy:  The risks of hot flashes and sexual dysfunction were discussed. He understands that hormone therapy does not cure the cancer, but it usually slows its growth.    Handouts were provided.       I spent 45 minutes caring for Dave on this date of service. This time includes time spent by me in the following activities:preparing for the visit, reviewing tests, obtaining and/or reviewing a separately obtained history, performing a medically appropriate examination and/or evaluation , counseling and educating the patient/family/caregiver, ordering medications, tests, or procedures, and documenting information in the medical record  Follow Up       No follow-ups on file.  Patient was given instructions and counseling regarding his condition or for health maintenance advice. Please see specific information pulled into the AVS if appropriate.

## 2023-10-19 PROBLEM — C61 PROSTATE CANCER: Status: ACTIVE | Noted: 2023-10-19

## 2023-10-23 ENCOUNTER — HOSPITAL ENCOUNTER (OUTPATIENT)
Dept: NUCLEAR MEDICINE | Facility: HOSPITAL | Age: 68
Discharge: HOME | End: 2023-10-23
Payer: MEDICARE

## 2023-10-23 ENCOUNTER — HOSPITAL ENCOUNTER (OUTPATIENT)
Dept: CT IMAGING | Facility: HOSPITAL | Age: 68
Discharge: HOME | End: 2023-10-23
Admitting: UROLOGY
Payer: MEDICARE

## 2023-10-23 DIAGNOSIS — C61 PROSTATE CANCER: ICD-10-CM

## 2023-10-23 LAB
CREAT BLDA-MCNC: 0.8 MG/DL
EGFRCR SERPLBLD CKD-EPI 2021: 97.6 ML/MIN/1.73

## 2023-10-23 PROCEDURE — 78306 BONE IMAGING WHOLE BODY: CPT

## 2023-10-23 PROCEDURE — A9503 TC99M MEDRONATE: HCPCS | Performed by: UROLOGY

## 2023-10-23 PROCEDURE — 82565 ASSAY OF CREATININE: CPT

## 2023-10-23 PROCEDURE — 0 TECHNETIUM MEDRONATE KIT: Performed by: UROLOGY

## 2023-10-23 PROCEDURE — 74178 CT ABD&PLV WO CNTR FLWD CNTR: CPT

## 2023-10-23 PROCEDURE — 25510000001 IOPAMIDOL PER 1 ML: Performed by: UROLOGY

## 2023-10-23 RX ORDER — TC 99M MEDRONATE 20 MG/10ML
22.3 INJECTION, POWDER, LYOPHILIZED, FOR SOLUTION INTRAVENOUS
Status: COMPLETED | OUTPATIENT
Start: 2023-10-23 | End: 2023-10-23

## 2023-10-23 RX ADMIN — IOPAMIDOL 100 ML: 755 INJECTION, SOLUTION INTRAVENOUS at 08:25

## 2023-10-23 RX ADMIN — TC 99M MEDRONATE 22.3 MILLICURIE: 20 INJECTION, POWDER, LYOPHILIZED, FOR SOLUTION INTRAVENOUS at 07:30

## 2023-10-25 ENCOUNTER — OFFICE VISIT (OUTPATIENT)
Dept: UROLOGY | Facility: CLINIC | Age: 68
End: 2023-10-25
Payer: MEDICARE

## 2023-10-25 VITALS
BODY MASS INDEX: 37.48 KG/M2 | DIASTOLIC BLOOD PRESSURE: 88 MMHG | SYSTOLIC BLOOD PRESSURE: 148 MMHG | HEIGHT: 67 IN | WEIGHT: 238.8 LBS

## 2023-10-25 DIAGNOSIS — C61 PROSTATE CANCER: Primary | ICD-10-CM

## 2023-10-25 NOTE — PROGRESS NOTES
"Chief Complaint  Follow-up (CT NUC MED SCANS)    Subjective          Dave Lowe presents to [facility] UROLOGY    History of Present Illness  Patient is follow-up for prostate cancer.  Recent bone scan and CT scan do not reveal any obvious evidence of disease outside of his prostate.      Objective   Vital Signs:   /88   Ht 170.2 cm (67\")   Wt 108 kg (238 lb 12.8 oz)   BMI 37.40 kg/m²       Physical Exam  Vitals and nursing note reviewed.   Constitutional:       Appearance: Normal appearance. He is well-developed.   Pulmonary:      Effort: Pulmonary effort is normal.      Breath sounds: Normal air entry.   Neurological:      Mental Status: He is alert and oriented to person, place, and time.      Motor: Motor function is intact.   Psychiatric:         Mood and Affect: Mood normal.         Behavior: Behavior normal.          Result Review :   The following data was reviewed by: Anais Cheney MD on 10/25/2023:    Results for orders placed or performed during the hospital encounter of 10/23/23   POC Creatinine    Specimen: Blood   Result Value Ref Range    Creatinine 0.80 mg/dL    eGFR 97.6 >60.0 mL/min/1.73       PSA          7/26/2023    09:41   PSA   PSA 5.150        Results    Procedure Component Value Ref Range Date/Time   NM Bone Scan Whole Body [[id number]] Collected: 10/23/23 1121   Order Status: Completed Updated: 10/23/23 1124   Narrative:     PROCEDURE: NM BONE SCAN WHOLE BODY     COMPARISON: [facility] LIDIA JADE, KNEE LIMITED ONE/TWO VIEWS LT, 9/28/2020, 14:51.     INDICATIONS: Prostate cancer, high risk, staging     TECHNIQUE: After obtaining the patient's consent, Technetium 99m MDP was injected intravenously.  Images were obtained approximately two hours later.       RADIONUCLIDE:         22.3mCi    Tc99m MDP - I.V.     FINDINGS:  There is photopenia at both knee joints related to bilateral knee arthroplasties.  There has  probably been revision arthroplasty on " the left with long stemmed components.  There is expected  increased bone turnover at the margins of the prostheses.  There is degenerative pattern of  activities involving the shoulder girdles, hands, and feet.  No suspicious abnormal bone turnover  is demonstrated.  Activity is present in both kidneys and urinary bladder with no abnormal soft  tissue uptake demonstrated      Impression:          1. Status post bilateral knee arthroplasties with expected activity at the margins of the  prostheses     2. Degenerative activity of the shoulder girdles, hands, and feet            TAVON ARROYO MD        Electronically Signed and Approved By: TAVON ARROYO MD on 10/23/2023 at 11:21         Results    Procedure Component Value Ref Range Date/Time   CT Abdomen Pelvis With & Without Contrast [[id number]] Collected: 10/23/23 1045   Order Status: Completed Updated: 10/23/23 1048   Narrative:     PROCEDURE: CT ABDOMEN PELVIS W WO CONTRAST     COMPARISON: None     INDICATIONS: Prostate cancer, high risk, staging     TECHNIQUE: After obtaining the patient's consent, CT images of the abdomen were created without and  with non-ionic intravenous contrast material, and CT images of the pelvis were obtained with  non-ionic intravenous contrast material.       PROTOCOL:   Urology imaging protocol performed     RADIATION:   DLP: 3075mGy*cm    Automated exposure control was utilized to minimize radiation dose.  CONTRAST: 100cc Isovue 370 I.V.     FINDINGS:  Genitourinary system:  There are no renal, ureteral, or bladder stones.  There is symmetric  bilateral renal enhancement.  There is no hydronephrosis or hydroureter.  There is a small  low-density intracortical cyst measuring 10 mm within the posterior aspect of the right kidney.    There is no evidence of solid renal mass.  Delayed phase imaging demonstrates no abnormal  urothelial thickening or filling defects within the upper collecting system.  The prostate appears  normal in  size.  There is mildly asymmetric bulging along the left lateral aspect of the prostate  gland.  The urinary bladder is fluid filled without focal wall thickening.     Remainder of the abdomen and pelvis:  There is mild bandlike atelectasis within the lung bases.    The liver has a subtle nodular contour which can be seen with underlying cirrhosis/chronic liver  disease.  There is cholelithiasis without findings of acute cholecystitis.  The spleen size is at  the upper limit of normal.  There is a small low-density lesion within the inferior tip of the  spleen, likely representing a cyst.  The adrenal glands and pancreas appear within normal limits.     The stomach and duodenum are normal in caliber and configuration.  There are no abnormally dilated  loops of small bowel to suggest small bowel obstruction or small bowel inflammation.  The appendix  is not visualized and likely absent.  There is a moderate to large colonic stool burden.  There is  sigmoid diverticulosis without acute diverticulitis.     The aorta is normal in caliber without evidence of aneurysm formation.  There are small  retroperitoneal lymph nodes not meeting size criteria for pathologic enlargement.  There is a 1.3 x  2.1 cm high left external iliac chain lymph node best seen on image 187 of series 4. There are  bilateral pelvic sidewall lymph nodes within the posterior external iliac chain measuring 1.8 x 1.0  cm on the left and 1.9 x 1.2 cm on the right.     There are multilevel degenerative changes of the lumbar spine.  No suspicious lytic or sclerotic  osseous lesions.      Impression:       1. Mildly enlarged bilateral external iliac chain lymph nodes which may be reactive or possibly  metastatic given the high-risk prostate cancer.  2. Slightly asymmetric bulge of the left lateral aspect of the prostate gland.  No definite  extracapsular extension by CT.  3. Subtle nodular contour of the liver likely related to underlying  cirrhosis/chronic liver  disease.  4. Cholelithiasis without findings of acute cholecystitis.              CHERI HOPKINS MD        Electronically Signed and Approved By: CHERI HOPKINS MD on 10/23/2023 at 10:45                Assessment and Plan    Diagnoses and all orders for this visit:    1. Prostate cancer (Primary)  -     Ambulatory Referral to Radiation Oncology-External    Patient prefers a referral to [facility] for radiation.  He does not want to have surgery and is interested in brachytherapy or possible external radiation.  I will get him set up with that referral.          Follow Up       No follow-ups on file.  Patient was given instructions and counseling regarding his condition or for health maintenance advice. Please see specific information pulled into the AVS if appropriate.

## 2023-10-27 ENCOUNTER — TELEPHONE (OUTPATIENT)
Dept: SURGERY | Facility: CLINIC | Age: 68
End: 2023-10-27
Payer: MEDICARE

## 2023-10-27 NOTE — TELEPHONE ENCOUNTER
[facility] is calling back to let you know that the patient has been scheduled to see Dr. Mariscal on 11/2/23 @ 1pm. She states that she spoke to the patient and he is aware. He was also given their address and phone # incase he needs to reschedule.

## 2023-11-16 ENCOUNTER — ANESTHESIA (OUTPATIENT)
Dept: SURGERY | Facility: SURGERY CENTER | Age: 68
End: 2023-11-16
Payer: MEDICARE

## 2023-11-16 ENCOUNTER — ANESTHESIA EVENT (OUTPATIENT)
Dept: SURGERY | Facility: SURGERY CENTER | Age: 68
End: 2023-11-16
Payer: MEDICARE

## 2023-11-16 ENCOUNTER — HOSPITAL ENCOUNTER (OUTPATIENT)
Facility: SURGERY CENTER | Age: 68
Setting detail: HOSPITAL OUTPATIENT SURGERY
Discharge: HOME | End: 2023-11-16
Attending: INTERNAL MEDICINE | Admitting: INTERNAL MEDICINE
Payer: MEDICARE

## 2023-11-16 VITALS
SYSTOLIC BLOOD PRESSURE: 162 MMHG | OXYGEN SATURATION: 94 % | RESPIRATION RATE: 16 BRPM | HEART RATE: 75 BPM | WEIGHT: 234.6 LBS | DIASTOLIC BLOOD PRESSURE: 94 MMHG | TEMPERATURE: 98.2 F | BODY MASS INDEX: 36.74 KG/M2

## 2023-11-16 DIAGNOSIS — Z12.11 ENCOUNTER FOR SCREENING FOR MALIGNANT NEOPLASM OF COLON: ICD-10-CM

## 2023-11-16 LAB — GLUCOSE BLDC GLUCOMTR-MCNC: 167 MG/DL (ref 70–130)

## 2023-11-16 PROCEDURE — 45385 COLONOSCOPY W/LESION REMOVAL: CPT | Performed by: INTERNAL MEDICINE

## 2023-11-16 PROCEDURE — 25010000002 LIDOCAINE 1 % SOLUTION: Performed by: ANESTHESIOLOGY

## 2023-11-16 PROCEDURE — 25010000002 PROPOFOL 1000 MG/100ML EMULSION: Performed by: ANESTHESIOLOGY

## 2023-11-16 PROCEDURE — 25810000003 LACTATED RINGERS PER 1000 ML: Performed by: INTERNAL MEDICINE

## 2023-11-16 PROCEDURE — 45381 COLONOSCOPY SUBMUCOUS NJX: CPT | Performed by: INTERNAL MEDICINE

## 2023-11-16 PROCEDURE — 45380 COLONOSCOPY AND BIOPSY: CPT | Performed by: INTERNAL MEDICINE

## 2023-11-16 PROCEDURE — 25010000002 PROPOFOL 10 MG/ML EMULSION: Performed by: ANESTHESIOLOGY

## 2023-11-16 PROCEDURE — 88305 TISSUE EXAM BY PATHOLOGIST: CPT | Performed by: INTERNAL MEDICINE

## 2023-11-16 RX ORDER — SODIUM CHLORIDE, SODIUM LACTATE, POTASSIUM CHLORIDE, CALCIUM CHLORIDE 600; 310; 30; 20 MG/100ML; MG/100ML; MG/100ML; MG/100ML
30 INJECTION, SOLUTION INTRAVENOUS CONTINUOUS PRN
Status: DISCONTINUED | OUTPATIENT
Start: 2023-11-16 | End: 2023-11-16 | Stop reason: HOSPADM

## 2023-11-16 RX ORDER — SODIUM CHLORIDE 0.9 % (FLUSH) 0.9 %
10 SYRINGE (ML) INJECTION AS NEEDED
Status: DISCONTINUED | OUTPATIENT
Start: 2023-11-16 | End: 2023-11-16 | Stop reason: HOSPADM

## 2023-11-16 RX ORDER — LIDOCAINE HYDROCHLORIDE 10 MG/ML
INJECTION, SOLUTION INFILTRATION; PERINEURAL AS NEEDED
Status: DISCONTINUED | OUTPATIENT
Start: 2023-11-16 | End: 2023-11-16 | Stop reason: SURG

## 2023-11-16 RX ORDER — SODIUM CHLORIDE 0.9 % (FLUSH) 0.9 %
3 SYRINGE (ML) INJECTION EVERY 12 HOURS SCHEDULED
Status: DISCONTINUED | OUTPATIENT
Start: 2023-11-16 | End: 2023-11-16 | Stop reason: HOSPADM

## 2023-11-16 RX ORDER — PROPOFOL 10 MG/ML
INJECTION, EMULSION INTRAVENOUS AS NEEDED
Status: DISCONTINUED | OUTPATIENT
Start: 2023-11-16 | End: 2023-11-16 | Stop reason: SURG

## 2023-11-16 RX ADMIN — PROPOFOL 150 MG: 10 INJECTION, EMULSION INTRAVENOUS at 09:30

## 2023-11-16 RX ADMIN — PROPOFOL INJECTABLE EMULSION 200 MCG/KG/MIN: 10 INJECTION, EMULSION INTRAVENOUS at 09:30

## 2023-11-16 RX ADMIN — SODIUM CHLORIDE, POTASSIUM CHLORIDE, SODIUM LACTATE AND CALCIUM CHLORIDE 30 ML/HR: 600; 310; 30; 20 INJECTION, SOLUTION INTRAVENOUS at 09:03

## 2023-11-16 RX ADMIN — LIDOCAINE HYDROCHLORIDE 40 MG: 10 INJECTION, SOLUTION INFILTRATION; PERINEURAL at 09:30

## 2023-11-16 NOTE — H&P
No chief complaint on file.      HPI  Patient today for screening colonoscopy.         Problem List:    Patient Active Problem List   Diagnosis    Arthritis of knee, right    Status post total left knee replacement    History of arthroplasty of left knee    Right knee pain    Left shoulder pain    Primary osteoarthritis of right knee    Right shoulder pain    Status post total knee replacement, left    Obesity, Class II, BMI 35-39.9, with comorbidity    Hypoxia    Hypomagnesemia    Hypercalcemia    Dyspnea    Diabetes mellitus    Biliary calculus    Acute pancreatitis    Biceps tendinitis    Bursitis of shoulder    Colitis    Erectile dysfunction    Hyperlipidemia    High blood pressure    Hypertension    Increased frequency of urination    Insomnia    Presence of left artificial knee joint    Primary osteoarthritis of both knees    Loose total knee arthroplasty    Encounter for screening for malignant neoplasm of colon    Elevated prostate specific antigen (PSA)    Prostate cancer       Medical History:    Past Medical History:   Diagnosis Date    Alcoholism     Benign prostatic hyperplasia     Colon polyp     Diabetes     Elevated prostate specific antigen (PSA) 09/15/2023    Erectile dysfunction     HTN (hypertension)     Prostate cancer 10/19/2023    Prostatitis     Skin cancer         Social History:    Social History     Socioeconomic History    Marital status: Single   Tobacco Use    Smoking status: Never    Smokeless tobacco: Never   Vaping Use    Vaping Use: Never used   Substance and Sexual Activity    Alcohol use: No    Drug use: No    Sexual activity: Not Currently       Family History:   Family History   Problem Relation Age of Onset    Cancer Other         Lung    Heart disease Other     Hypertension Other     Malig Hyperthermia Neg Hx        Surgical History:   Past Surgical History:   Procedure Laterality Date    APPENDECTOMY      COLONOSCOPY      Every 5 Years    KNEE ARTHROPLASTY Right 07/01/2016     Dr. Bryan Li    KNEE ARTHROPLASTY Left 2022    KNEE SURGERY Left 2007    Dr. Engle     PROSTATE BIOPSY N/A 10/5/2023    Procedure: PROSTATE ULTRASOUND BIOPSY MAGNETIC RESONANCE IMAGING FUSION WITH URONAV;  Surgeon: Anais Cheney MD;  Location[facility] OR;  Service: Urology;  Laterality: N/A;    WISDOM TOOTH EXTRACTION         No current facility-administered medications for this encounter.    Allergies:   Allergies   Allergen Reactions    Canagliflozin GI Intolerance        The following portions of the patient's history were reviewed by me and updated as appropriate: review of systems, allergies, current medications, past family history, past medical history, past social history, past surgical history and problem list.    There were no vitals filed for this visit.    PHYSICAL EXAM:    CONSTITUTIONAL:  today's vital signs reviewed by me  GASTROINTESTINAL: abdomen is soft nontender nondistended with normal active bowel sounds, no masses are appreciated    Assessment/ Plan  We will proceed today with screening colonoscopy.    Risks and benefits as well as alternatives to endoscopic evaluation were explained to the patient and they voiced understanding and wish to proceed.  These risks include but are not limited to the risk of bleeding, perforation, adverse reaction to sedation, and missed lesions.  The patient was given the opportunity to ask questions prior to the endoscopic procedure.

## 2023-11-16 NOTE — ANESTHESIA PREPROCEDURE EVALUATION
Anesthesia Evaluation     Patient summary reviewed and Nursing notes reviewed   NPO Solid Status: > 8 hours  NPO Liquid Status: > 2 hours           Airway   Mallampati: II  TM distance: >3 FB  Neck ROM: full  No difficulty expected  Dental - normal exam     Pulmonary - negative pulmonary ROS   Cardiovascular - normal exam    (+) hypertension, hyperlipidemia      Neuro/Psych- negative ROS  GI/Hepatic/Renal/Endo    (+) obesity, diabetes mellitus    Musculoskeletal     Abdominal   (+) obese    Bowel sounds: normal.   Substance History - negative use     OB/GYN negative ob/gyn ROS         Other   arthritis,   history of cancer active                Anesthesia Plan    ASA 3     MAC     intravenous induction     Anesthetic plan, risks, benefits, and alternatives have been provided, discussed and informed consent has been obtained with: patient.  Pre-procedure education provided    CODE STATUS:

## 2023-11-16 NOTE — ANESTHESIA POSTPROCEDURE EVALUATION
Patient: Dave Lowe    Procedure Summary       Date: 11/16/23 Room / Location: [facility]    Anesthesia Start: 0926 Anesthesia Stop: 1004    Procedure: COLONOSCOPY with polypectomy Diagnosis:       Encounter for screening for malignant neoplasm of colon      (Encounter for screening for malignant neoplasm of colon [Z12.11])    Surgeons: Shivam Peter MD Provider: Armando Arce MD    Anesthesia Type: MAC ASA Status: 3            Anesthesia Type: MAC    Vitals  Vitals Value Taken Time   /94 11/16/23 1031   Temp 36.8 °C (98.2 °F) 11/16/23 1002   Pulse 75 11/16/23 1031   Resp 16 11/16/23 1031   SpO2 94 % 11/16/23 1031           Post Anesthesia Care and Evaluation    Patient location during evaluation: bedside  Patient participation: complete - patient participated  Level of consciousness: awake  Pain management: adequate    Airway patency: patent  Anesthetic complications: No anesthetic complications  PONV Status: none  Cardiovascular status: acceptable  Respiratory status: acceptable  Hydration status: acceptable  Post Neuraxial Block status: Motor and sensory function returned to baseline

## 2023-11-17 LAB
LAB AP CASE REPORT: NORMAL
LAB AP CLINICAL INFORMATION: NORMAL
LAB AP DIAGNOSIS COMMENT: NORMAL
PATH REPORT.FINAL DX SPEC: NORMAL
PATH REPORT.GROSS SPEC: NORMAL

## 2023-11-21 DIAGNOSIS — C18.4 MALIGNANT NEOPLASM OF TRANSVERSE COLON: Primary | ICD-10-CM

## 2023-11-24 LAB
LAB AP CASE REPORT: NORMAL
LAB AP CLINICAL INFORMATION: NORMAL
LAB AP DIAGNOSIS COMMENT: NORMAL
PATH REPORT.ADDENDUM SPEC: NORMAL
PATH REPORT.FINAL DX SPEC: NORMAL
PATH REPORT.GROSS SPEC: NORMAL

## 2024-10-03 ENCOUNTER — TRANSCRIBE ORDERS (OUTPATIENT)
Dept: ADMINISTRATIVE | Facility: HOSPITAL | Age: 69
End: 2024-10-03
Payer: MEDICARE

## 2024-10-03 DIAGNOSIS — R41.3 OTHER AMNESIA: Primary | ICD-10-CM

## 2024-10-09 ENCOUNTER — HOSPITAL ENCOUNTER (OUTPATIENT)
Dept: CT IMAGING | Facility: HOSPITAL | Age: 69
Discharge: HOME | End: 2024-10-09
Admitting: INTERNAL MEDICINE
Payer: MEDICARE

## 2024-10-09 DIAGNOSIS — R41.3 OTHER AMNESIA: ICD-10-CM

## 2024-10-09 PROCEDURE — 70450 CT HEAD/BRAIN W/O DYE: CPT

## (undated) DEVICE — MARKR SKIN W/RULR AND LBL

## (undated) DEVICE — GLV SURG SENSICARE SLT PF LF 6.5 STRL

## (undated) DEVICE — KT ORCA ORCAPOD DISP STRL

## (undated) DEVICE — ADAPT CLN SCPE ENDO PORPOISE BX/50 DISP

## (undated) DEVICE — VIAL FORMLN CAP 10PCT 20ML

## (undated) DEVICE — GOWN ISOL W/THUMB UNIV BLU BX/15

## (undated) DEVICE — GOWN ,SIRUS,NONREINFORCED 3XL: Brand: MEDLINE

## (undated) DEVICE — TOWEL,OR,DSP,ST,BLUE,STD,4/PK,20PK/CS: Brand: MEDLINE

## (undated) DEVICE — CANN O2 ETCO2 FITS ALL CONN CO2 SMPL A/ 7IN DISP LF

## (undated) DEVICE — SHEET,DRAPE,70X85,STERILE: Brand: MEDLINE

## (undated) DEVICE — MAX-CORE® DISPOSABLE CORE BIOPSY INSTRUMENT, 18G X 25CM: Brand: MAX-CORE

## (undated) DEVICE — SYRINGE, LUER SLIP, STERILE, 60ML: Brand: MEDLINE

## (undated) DEVICE — LASSO POLYPECTOMY SNARE: Brand: LASSO

## (undated) DEVICE — Device

## (undated) DEVICE — DRSNG TELFA PAD NONADH STR 1S 3X4IN

## (undated) DEVICE — FLEX ADVANTAGE 1500CC: Brand: FLEX ADVANTAGE

## (undated) DEVICE — NDL SCLEROTHERAPY INTERJECT 25G 4 240CM